# Patient Record
(demographics unavailable — no encounter records)

---

## 2024-11-18 NOTE — HISTORY OF PRESENT ILLNESS
[Gradual] : gradual [Dull/Aching] : dull/aching [Intermittent] : intermittent [Leisure] : leisure [Rest] : rest [Injection therapy] : injection therapy [Stairs] : stairs [Retired] : Work status: retired [3] : 3 [Gelsyn] : Gelsyn [de-identified] : 11/18/2024 Has long h/o OA left knee, had ACL reconstruction in the 90s, had second surgery for meniscus.  Gets decent relief from Gelsyn injections, was hoping to repeat them.  [] : Post Surgical Visit: no [FreeTextEntry1] : L Knee  [FreeTextEntry5] : LFT knee pain, here for gel injection [de-identified] : Dr. Ann  [de-identified] : 5/8/2024 [de-identified] : L Knee

## 2024-11-18 NOTE — PHYSICAL EXAM
[Left] : left knee [5___] : hamstring 5[unfilled]/5 [] : negative Lachmann [Equivocal] : equivocal Shantanu [TWNoteComboBox7] : flexion 115 degrees [de-identified] : extension 3 degrees

## 2024-11-18 NOTE — IMAGING
[Left] : left knee [advanced tricompartmental OA with medial compartment narrowing and varus alignment] : advanced tricompartmental OA with medial compartment narrowing and varus alignment [FreeTextEntry9] : screws distal femur and proximal tibia

## 2025-01-07 NOTE — PROCEDURE
[Large Joint Injection] : Large joint injection [Left] : of the left [Knee] : knee [Pain] : pain [Alcohol] : alcohol [Betadine] : betadine [Ethyl Chloride sprayed topically] : ethyl chloride sprayed topically [Sterile technique used] : sterile technique used [Gel-Syn (16.8mg)] : 16.8mg of Gel-Syn [#1] : series #1 [] : Patient tolerated procedure well [Call if redness, pain or fever occur] : call if redness, pain or fever occur [Apply ice for 15min out of every hour for the next 12-24 hours as tolerated] : apply ice for 15 minutes out of every hour for the next 12-24 hours as tolerated [Patient was advised to rest the joint(s) for ____ days] : patient was advised to rest the joint(s) for [unfilled] days [Previous OTC use and PT nontherapeutic] : patient has tried OTC's including aspirin, Ibuprofen, Aleve, etc or prescription NSAIDS, and/or exercises at home and/or physical therapy without satisfactory response [Patient had decreased mobility in the joint] : patient had decreased mobility in the joint [Risks, benefits, alternatives discussed / Verbal consent obtained] : the risks benefits, and alternatives have been discussed, and verbal consent was obtained

## 2025-01-07 NOTE — HISTORY OF PRESENT ILLNESS
[de-identified] : 01/07/2025 for Gelsyn series left knee 11/18/2024 Has long h/o OA left knee, had ACL reconstruction in the 90s, had second surgery for meniscus.  Gets decent relief from Gelsyn injections, was hoping to repeat them.  [Gradual] : gradual [Dull/Aching] : dull/aching [Intermittent] : intermittent [Leisure] : leisure [Rest] : rest [Injection therapy] : injection therapy [Stairs] : stairs [Retired] : Work status: retired [3] : 3 [Gelsyn] : Gelsyn [] : Post Surgical Visit: no [FreeTextEntry1] : L Knee  [FreeTextEntry5] : LFT knee pain, here for gel injection [de-identified] : Dr. Ann  [de-identified] : 5/8/2024 [de-identified] : L Knee

## 2025-01-07 NOTE — PHYSICAL EXAM
[Left] : left knee [5___] : hamstring 5[unfilled]/5 [] : negative Lachmann [Equivocal] : equivocal Shantanu [TWNoteComboBox7] : flexion 115 degrees [de-identified] : extension 3 degrees

## 2025-01-14 NOTE — HISTORY OF PRESENT ILLNESS
[de-identified] : 01/14/2025 Gelsyn 3 #2 01/07/2025 for Gelsyn series left knee 11/18/2024 Has long h/o OA left knee, had ACL reconstruction in the 90s, had second surgery for meniscus.  Gets decent relief from Gelsyn injections, was hoping to repeat them.  [Gradual] : gradual [Dull/Aching] : dull/aching [Intermittent] : intermittent [Leisure] : leisure [Rest] : rest [Injection therapy] : injection therapy [Stairs] : stairs [Retired] : Work status: retired [3] : 3 [Gelsyn] : Gelsyn [] : Post Surgical Visit: no [FreeTextEntry1] : L Knee  [FreeTextEntry5] : LFT knee pain, here for gel injection [de-identified] : Dr. Ann  [de-identified] : 5/8/2024 [de-identified] : L Knee

## 2025-01-14 NOTE — PROCEDURE
[Large Joint Injection] : Large joint injection [Left] : of the left [Knee] : knee [Pain] : pain [Alcohol] : alcohol [Betadine] : betadine [Ethyl Chloride sprayed topically] : ethyl chloride sprayed topically [Sterile technique used] : sterile technique used [Gel-Syn (16.8mg)] : 16.8mg of Gel-Syn [#2] : series #2 [] : Patient tolerated procedure well [Call if redness, pain or fever occur] : call if redness, pain or fever occur [Apply ice for 15min out of every hour for the next 12-24 hours as tolerated] : apply ice for 15 minutes out of every hour for the next 12-24 hours as tolerated [Patient was advised to rest the joint(s) for ____ days] : patient was advised to rest the joint(s) for [unfilled] days [Previous OTC use and PT nontherapeutic] : patient has tried OTC's including aspirin, Ibuprofen, Aleve, etc or prescription NSAIDS, and/or exercises at home and/or physical therapy without satisfactory response [Patient had decreased mobility in the joint] : patient had decreased mobility in the joint [Risks, benefits, alternatives discussed / Verbal consent obtained] : the risks benefits, and alternatives have been discussed, and verbal consent was obtained

## 2025-01-14 NOTE — PHYSICAL EXAM
[Left] : left knee [5___] : hamstring 5[unfilled]/5 [] : negative Lachmann [Equivocal] : equivocal Shantanu [TWNoteComboBox7] : flexion 115 degrees [de-identified] : extension 3 degrees

## 2025-01-21 NOTE — PROCEDURE
[Large Joint Injection] : Large joint injection [Left] : of the left [Knee] : knee [Pain] : pain [Alcohol] : alcohol [Betadine] : betadine [Ethyl Chloride sprayed topically] : ethyl chloride sprayed topically [Sterile technique used] : sterile technique used [Gel-Syn (16.8mg)] : 16.8mg of Gel-Syn [#3] : series #3 [] : Patient tolerated procedure well [Call if redness, pain or fever occur] : call if redness, pain or fever occur [Apply ice for 15min out of every hour for the next 12-24 hours as tolerated] : apply ice for 15 minutes out of every hour for the next 12-24 hours as tolerated [Patient was advised to rest the joint(s) for ____ days] : patient was advised to rest the joint(s) for [unfilled] days [Previous OTC use and PT nontherapeutic] : patient has tried OTC's including aspirin, Ibuprofen, Aleve, etc or prescription NSAIDS, and/or exercises at home and/or physical therapy without satisfactory response [Patient had decreased mobility in the joint] : patient had decreased mobility in the joint [Risks, benefits, alternatives discussed / Verbal consent obtained] : the risks benefits, and alternatives have been discussed, and verbal consent was obtained

## 2025-01-21 NOTE — PHYSICAL EXAM
[Left] : left knee [5___] : hamstring 5[unfilled]/5 [] : negative Lachmann [Equivocal] : equivocal Shantanu [TWNoteComboBox7] : flexion 115 degrees [de-identified] : extension 3 degrees

## 2025-01-21 NOTE — HISTORY OF PRESENT ILLNESS
[de-identified] : 01/21/2025 Gelsyn 3 #3 01/14/2025 Gelsyn 3 #2 01/07/2025 for Gelsyn series left knee 11/18/2024 Has long h/o OA left knee, had ACL reconstruction in the 90s, had second surgery for meniscus.  Gets decent relief from Gelsyn injections, was hoping to repeat them.  [Gradual] : gradual [Dull/Aching] : dull/aching [Intermittent] : intermittent [Leisure] : leisure [Rest] : rest [Injection therapy] : injection therapy [Stairs] : stairs [Retired] : Work status: retired [3] : 3 [Gelsyn] : Gelsyn [] : Post Surgical Visit: no [FreeTextEntry1] : L Knee  [FreeTextEntry5] : LFT knee pain, here for gel injection [de-identified] : Dr. Ann  [de-identified] : 5/8/2024 [de-identified] : L Knee

## 2025-07-22 NOTE — HISTORY OF PRESENT ILLNESS
[Gradual] : gradual [Dull/Aching] : dull/aching [Intermittent] : intermittent [Leisure] : leisure [Rest] : rest [Injection therapy] : injection therapy [Stairs] : stairs [Retired] : Work status: retired [3] : 3 [Gelsyn] : Gelsyn [de-identified] : 7/22/25- f/u left knee OA, would like to repeat viscoinjection series 01/21/2025 Gelsyn 3 #3 01/14/2025 Gelsyn 3 #2 01/07/2025 for Gelsyn series left knee 11/18/2024 Has long h/o OA left knee, had ACL reconstruction in the 90s, had second surgery for meniscus.  Gets decent relief from Gelsyn injections, was hoping to repeat them.  [] : Post Surgical Visit: no [FreeTextEntry1] : L Knee  [FreeTextEntry5] : LFT knee pain, here for gel injection [de-identified] : Dr. Ann  [de-identified] : 5/8/2024 [de-identified] : L Knee

## 2025-07-22 NOTE — PHYSICAL EXAM
[Left] : left knee [5___] : hamstring 5[unfilled]/5 [] : negative Lachmann [Equivocal] : equivocal Shantanu [TWNoteComboBox7] : flexion 115 degrees [de-identified] : extension 3 degrees